# Patient Record
Sex: MALE | Race: WHITE | HISPANIC OR LATINO | Employment: FULL TIME | URBAN - METROPOLITAN AREA
[De-identification: names, ages, dates, MRNs, and addresses within clinical notes are randomized per-mention and may not be internally consistent; named-entity substitution may affect disease eponyms.]

---

## 2024-04-22 ENCOUNTER — OFFICE VISIT (OUTPATIENT)
Dept: URGENT CARE | Facility: CLINIC | Age: 39
End: 2024-04-22
Payer: COMMERCIAL

## 2024-04-22 VITALS
OXYGEN SATURATION: 95 % | SYSTOLIC BLOOD PRESSURE: 205 MMHG | DIASTOLIC BLOOD PRESSURE: 105 MMHG | HEART RATE: 82 BPM | RESPIRATION RATE: 20 BRPM | WEIGHT: 315 LBS | TEMPERATURE: 96.5 F

## 2024-04-22 DIAGNOSIS — R06.2 WHEEZING: ICD-10-CM

## 2024-04-22 DIAGNOSIS — I10 UNCONTROLLED HYPERTENSION: ICD-10-CM

## 2024-04-22 DIAGNOSIS — J02.9 SORE THROAT: Primary | ICD-10-CM

## 2024-04-22 DIAGNOSIS — R05.8 PRODUCTIVE COUGH: ICD-10-CM

## 2024-04-22 LAB — POC RAPID STREP: NEGATIVE

## 2024-04-22 PROCEDURE — 3080F DIAST BP >= 90 MM HG: CPT | Performed by: PHYSICIAN ASSISTANT

## 2024-04-22 PROCEDURE — 3077F SYST BP >= 140 MM HG: CPT | Performed by: PHYSICIAN ASSISTANT

## 2024-04-22 PROCEDURE — 1036F TOBACCO NON-USER: CPT | Performed by: PHYSICIAN ASSISTANT

## 2024-04-22 PROCEDURE — 87880 STREP A ASSAY W/OPTIC: CPT | Performed by: PHYSICIAN ASSISTANT

## 2024-04-22 PROCEDURE — 99204 OFFICE O/P NEW MOD 45 MIN: CPT | Performed by: PHYSICIAN ASSISTANT

## 2024-04-22 RX ORDER — ALBUTEROL SULFATE 90 UG/1
2 AEROSOL, METERED RESPIRATORY (INHALATION) EVERY 6 HOURS PRN
Qty: 18 G | Refills: 0 | Status: SHIPPED | OUTPATIENT
Start: 2024-04-22 | End: 2025-04-22

## 2024-04-22 RX ORDER — PREDNISONE 10 MG/1
50 TABLET ORAL DAILY
Qty: 25 TABLET | Refills: 0 | Status: SHIPPED | OUTPATIENT
Start: 2024-04-22 | End: 2024-04-27

## 2024-04-22 RX ORDER — AZITHROMYCIN 250 MG/1
TABLET, FILM COATED ORAL
Qty: 6 TABLET | Refills: 0 | Status: SHIPPED | OUTPATIENT
Start: 2024-04-22 | End: 2024-04-27

## 2024-04-22 RX ORDER — AMLODIPINE BESYLATE 5 MG/1
5 TABLET ORAL DAILY
Qty: 30 TABLET | Refills: 2 | Status: SHIPPED | OUTPATIENT
Start: 2024-04-22 | End: 2024-07-21

## 2024-04-22 ASSESSMENT — PAIN SCALES - GENERAL: PAINLEVEL: 4

## 2024-04-22 NOTE — PROGRESS NOTES
Subjective   Patient ID: Nicholas Barber Jr. is a 38 y.o. male who presents for Sore Throat (Wheeze, chest congestion) and Cough.  Patient notes that symptoms have been ongoing for a week now and not seeming to improve with over-the-counter medications.  Denies any chest pain outside of the cough denies any nausea vomiting diarrhea or abdominal pain.  He does endorse some shortness of breath but is satting well on room air without any tachycardia or tachypnea.  Patient notes that he did have negative home COVID testing yesterday and declines further testing today.     Of note patient is excessively hypertensive.  Notes that he has been treated in the past for hypertension but is unsure of the medication that he was treated with.  Notes that he lost medical coverage a few years ago and never followed up because the cost of the medications and cost of the visit were insurmountable for him.  He denies any headache blurry vision double vision.  He does note that he has chest pain with the cough but denies any chest pain outside of the cough.  Patient also notes that he has been using over-the-counter decongestants to help with his respiratory symptoms which have been helping his respiratory symptoms but given the situation likely contributing to his hypertension        The remainder of the systems were reviewed and are negative unless noted above      Objective   BP (!) 205/105   Pulse 82   Temp 35.8 °C (96.5 °F)   Resp 20   Wt (!) 182 kg (401 lb)   SpO2 95%   Physical Exam  Vitals reviewed.   Constitutional:       General: He is not in acute distress.     Appearance: Normal appearance. He is obese. He is not toxic-appearing.   HENT:      Head: Normocephalic.      Right Ear: Tympanic membrane and ear canal normal. No tenderness. No mastoid tenderness.      Left Ear: Tympanic membrane and ear canal normal. No tenderness. No mastoid tenderness.      Nose: Congestion and rhinorrhea present.      Mouth/Throat:       Mouth: Mucous membranes are moist.      Pharynx: Oropharynx is clear. Posterior oropharyngeal erythema present.   Eyes:      Conjunctiva/sclera: Conjunctivae normal.      Pupils: Pupils are equal, round, and reactive to light.   Cardiovascular:      Rate and Rhythm: Normal rate and regular rhythm.      Heart sounds: No murmur heard.  Pulmonary:      Effort: No respiratory distress.      Breath sounds: No stridor. Wheezing present. No rhonchi or rales.   Chest:      Chest wall: No tenderness.   Abdominal:      Tenderness: There is no abdominal tenderness. There is no guarding.   Musculoskeletal:         General: Normal range of motion.   Skin:     General: Skin is warm and dry.      Capillary Refill: Capillary refill takes less than 2 seconds.      Findings: No erythema.   Neurological:      General: No focal deficit present.      Mental Status: He is alert.         Assessment/Plan   Problem List Items Addressed This Visit       Sore throat - Primary    Relevant Orders    POCT rapid strep A manually resulted (Completed)    Productive cough    Relevant Medications    azithromycin (Zithromax Z-Anuel) 250 mg tablet    Wheezing    Relevant Medications    predniSONE (Deltasone) 10 mg tablet    albuterol 90 mcg/actuation inhaler    Uncontrolled hypertension    Relevant Medications    amLODIPine (Norvasc) 5 mg tablet   Patient with persistent bronchiectatic cough over the course the last 7 days continues to have intermittent fevers.  Wheezing noted on exam.  Patient does note a history of smoking but quit 2 years ago.  I am treating with prednisone and albuterol for the wheezing sending coverage with azithromycin.    Otherwise the patient is excessively hypertensive today at time of exam.  No chest pain outside of his cough no headache.  Patient has been treated with antihypertensive medication in the past but is unsure what this was.  Has not been seen by primary care for some time due to lapse in medical coverage and the  cost of his medications became insurmountable form.  I am sending 5 mg dose of amlodipine to the patient's pharmacy and recommending prompt follow-up with primary care for recheck of the blood pressure.  If there is any onset of chest pain outside of the cough any onset of headaches blurry vision double vision weakness or shortness of breath recommending patient proceed immediately to the emergency room

## 2024-04-22 NOTE — PATIENT INSTRUCTIONS
Assessment/Plan   Problem List Items Addressed This Visit       Sore throat - Primary    Relevant Orders    POCT rapid strep A manually resulted (Completed)    Productive cough    Relevant Medications    azithromycin (Zithromax Z-Anuel) 250 mg tablet    Wheezing    Relevant Medications    predniSONE (Deltasone) 10 mg tablet    albuterol 90 mcg/actuation inhaler    Uncontrolled hypertension    Relevant Medications    amLODIPine (Norvasc) 5 mg tablet   Patient with persistent bronchiectatic cough over the course the last 7 days continues to have intermittent fevers.  Wheezing noted on exam.  Patient does note a history of smoking but quit 2 years ago.  I am treating with prednisone and albuterol for the wheezing sending coverage with azithromycin.    Otherwise the patient is excessively hypertensive today at time of exam.  No chest pain outside of his cough no headache.  Patient has been treated with antihypertensive medication in the past but is unsure what this was.  Has not been seen by primary care for some time due to lapse in medical coverage and the cost of his medications became insurmountable form.  I am sending 5 mg dose of amlodipine to the patient's pharmacy and recommending prompt follow-up with primary care for recheck of the blood pressure.  If there is any onset of chest pain outside of the cough any onset of headaches blurry vision double vision weakness or shortness of breath recommending patient proceed immediately to the emergency room

## 2024-07-22 ENCOUNTER — APPOINTMENT (OUTPATIENT)
Dept: CARDIOLOGY | Facility: CLINIC | Age: 39
End: 2024-07-22
Payer: COMMERCIAL

## 2024-07-22 VITALS
DIASTOLIC BLOOD PRESSURE: 105 MMHG | HEART RATE: 70 BPM | OXYGEN SATURATION: 96 % | WEIGHT: 315 LBS | SYSTOLIC BLOOD PRESSURE: 188 MMHG

## 2024-07-22 DIAGNOSIS — G47.33 OBSTRUCTIVE SLEEP APNEA: ICD-10-CM

## 2024-07-22 DIAGNOSIS — G47.33 OBSTRUCTIVE SLEEP APNEA: Primary | ICD-10-CM

## 2024-07-22 DIAGNOSIS — E78.00 PURE HYPERCHOLESTEROLEMIA: ICD-10-CM

## 2024-07-22 DIAGNOSIS — I10 UNCONTROLLED HYPERTENSION: Primary | ICD-10-CM

## 2024-07-22 DIAGNOSIS — R94.31 ABNORMAL EKG: ICD-10-CM

## 2024-07-22 PROBLEM — E66.813 CLASS 3 SEVERE OBESITY DUE TO EXCESS CALORIES WITH BODY MASS INDEX (BMI) OF 40.0 TO 44.9 IN ADULT: Status: ACTIVE | Noted: 2024-07-22

## 2024-07-22 PROBLEM — E66.01 CLASS 3 SEVERE OBESITY DUE TO EXCESS CALORIES WITH BODY MASS INDEX (BMI) OF 40.0 TO 44.9 IN ADULT (MULTI): Status: ACTIVE | Noted: 2024-07-22

## 2024-07-22 PROBLEM — E78.5 HYPERLIPIDEMIA: Status: ACTIVE | Noted: 2024-07-22

## 2024-07-22 PROCEDURE — 1036F TOBACCO NON-USER: CPT | Performed by: INTERNAL MEDICINE

## 2024-07-22 PROCEDURE — 3080F DIAST BP >= 90 MM HG: CPT | Performed by: INTERNAL MEDICINE

## 2024-07-22 PROCEDURE — 3077F SYST BP >= 140 MM HG: CPT | Performed by: INTERNAL MEDICINE

## 2024-07-22 PROCEDURE — 99205 OFFICE O/P NEW HI 60 MIN: CPT | Performed by: INTERNAL MEDICINE

## 2024-07-22 PROCEDURE — 93000 ELECTROCARDIOGRAM COMPLETE: CPT | Performed by: INTERNAL MEDICINE

## 2024-07-22 RX ORDER — AMLODIPINE BESYLATE 5 MG/1
5 TABLET ORAL DAILY
Qty: 30 TABLET | Refills: 3 | Status: SHIPPED | OUTPATIENT
Start: 2024-07-22 | End: 2024-07-25

## 2024-07-22 RX ORDER — HYDROCHLOROTHIAZIDE 25 MG/1
25 TABLET ORAL DAILY
Qty: 90 TABLET | Refills: 3 | Status: SHIPPED | OUTPATIENT
Start: 2024-07-22 | End: 2025-07-22

## 2024-07-22 NOTE — PROGRESS NOTES
Referred by Dr. Durham for New Patient Visit (Patient is here as a new patient)     History Of Present Illness:    Nicholas Barber Jr. is a 39 y.o. male with history of hyperlipidemia, hypertension, morbid obesity, obstructive sleep apnea presenting with hypertension.  To ED several months ago with bronchitis-BP was high(over 200 mmhg) and started on amlodipine.  Note -BP had not been checked in several years  Had SOB with bronchitis-resolved  Occasional R ankle swelling  Patient denies chest pain/palpitations/dizziness/lightheadedness/claudication    Active-lift weight/pool exercise            Past Medical History:  HTN  HLD  Obesity    Past Surgical History:  None      Social History:  He reports that he has quit smoking. His smoking use included cigarettes. He has quit using smokeless tobacco. He reports that he does not currently use drugs after having used the following drugs: Marijuana. No history on file for alcohol use.    Family History:  Father had CABG at 38      Allergies:  Penicillins    Outpatient Medications:  Current Outpatient Medications   Medication Instructions    albuterol 90 mcg/actuation inhaler 2 puffs, inhalation, Every 6 hours PRN    amLODIPine (NORVASC) 5 mg, oral, Daily        Last Recorded Vitals:  Vitals:    07/22/24 0817 07/22/24 0905   BP:  (!) 188/105   BP Location: Left arm    Patient Position: Sitting    BP Cuff Size: Adult    Pulse: 70    SpO2: 96%    Weight: (!) 190 kg (418 lb)        Physical Exam:  Constitutional:       Appearance: Healthy appearance. Not in distress. Morbidly obese.   Neck:      Vascular: No JVR. JVD normal.   Pulmonary:      Effort: Pulmonary effort is normal.      Breath sounds: Normal breath sounds. No wheezing. No rhonchi. No rales.   Chest:      Chest wall: Not tender to palpatation.   Cardiovascular:      PMI at left midclavicular line. Normal rate. Regular rhythm. Normal S1. Normal S2.       Murmurs: There is no murmur.      No gallop.  No click.  "No rub.   Pulses:     Intact distal pulses.   Edema:     Peripheral edema absent.   Abdominal:      General: Abdomen is protuberant. Bowel sounds are normal.      Palpations: Abdomen is soft.      Tenderness: There is no abdominal tenderness.   Musculoskeletal: Normal range of motion.         General: No tenderness. Skin:     General: Skin is warm and dry.   Neurological:      General: No focal deficit present.      Mental Status: Alert and oriented to person, place and time.            Last Labs:  CBC -  No results found for: \"WBC\", \"HGB\", \"HCT\", \"MCV\", \"PLT\"    CMP -  No results found for: \"CALCIUM\", \"PHOS\", \"PROT\", \"ALBUMIN\", \"AST\", \"ALT\", \"ALKPHOS\", \"BILITOT\"    LIPID PANEL -   No results found for: \"CHOL\", \"TRIG\", \"HDL\", \"CHHDL\", \"LDLF\", \"VLDL\", \"LDLDIRECT\"    RENAL FUNCTION PANEL -   No results found for: \"GLUCOSE\", \"NA\", \"K\", \"CL\", \"CO2\", \"ANIONGAP\", \"BUN\", \"CREATININE\", \"GFRMALE\", \"CALCIUM\", \"PHOS\", \"ALBUMIN\"     No results found for: \"BNP\", \"HGBA1C\"    Last Cardiology Tests:  ECG:  Normal sinus rhythm  T wave abnormality consider inferolateral ischemia    Echo:  No results found for this or any previous visit from the past 1095 days.      Ejection Fractions:  No results found for: \"EF\"    Cath:  No results found for this or any previous visit from the past 1095 days.      Stress Test:  No results found for this or any previous visit from the past 1095 days.      Cardiac Imaging:  No results found for this or any previous visit from the past 1095 days.        Lab review: I have personally reviewed the laboratory result(s)     Assessment/Plan   Problem List Items Addressed This Visit       Uncontrolled hypertension - Primary    Overview     BP elevated in office  Only on low dose amlodipine-increase amlodipine and add hydrochlorothiazide  Check BMP 1 week         Relevant Orders    ECG 12 Lead (Completed)    Hyperlipidemia    Overview     Check lipids         Abnormal EKG    Overview     INFLAT T wave changes " -may be repolarization  changes related to long standing uncontrolled HTN  No angina  Control/BP/check echo         Obstructive sleep apnea    Overview     No longer wearing CPAP  Still snores-no recent evaluation.  Check sleep study         Weight loss  Watch salt intake  Increase amlodipine to 5 mg daily  Start hydrochlorothiazide once daily for BP  BMP 1 week  Lipids  Echo=HTN/abn EKG  Sleep study    Todd Durham, DO

## 2024-07-22 NOTE — PATIENT INSTRUCTIONS
Weight loss  Watch salt intake  Increase amlodipine to 5 mg daily  Start hydrochlorothiazide once daily for BP  BMP 1 week  Lipids  Echo=HTN/abn EKG  Sleep study

## 2024-07-25 DIAGNOSIS — I10 UNCONTROLLED HYPERTENSION: ICD-10-CM

## 2024-07-25 RX ORDER — AMLODIPINE BESYLATE 5 MG/1
5 TABLET ORAL DAILY
Qty: 90 TABLET | Refills: 3 | Status: SHIPPED | OUTPATIENT
Start: 2024-07-25

## 2024-07-29 ENCOUNTER — LAB (OUTPATIENT)
Dept: LAB | Facility: LAB | Age: 39
End: 2024-07-29
Payer: COMMERCIAL

## 2024-07-29 DIAGNOSIS — I10 UNCONTROLLED HYPERTENSION: ICD-10-CM

## 2024-07-29 DIAGNOSIS — E78.00 PURE HYPERCHOLESTEROLEMIA: ICD-10-CM

## 2024-07-29 LAB
ANION GAP SERPL CALC-SCNC: 12 MMOL/L (ref 10–20)
BUN SERPL-MCNC: 15 MG/DL (ref 6–23)
CALCIUM SERPL-MCNC: 9.8 MG/DL (ref 8.6–10.3)
CHLORIDE SERPL-SCNC: 100 MMOL/L (ref 98–107)
CHOLEST SERPL-MCNC: 241 MG/DL (ref 0–199)
CHOLESTEROL/HDL RATIO: 5.9
CO2 SERPL-SCNC: 29 MMOL/L (ref 21–32)
CREAT SERPL-MCNC: 1.03 MG/DL (ref 0.5–1.3)
EGFRCR SERPLBLD CKD-EPI 2021: >90 ML/MIN/1.73M*2
GLUCOSE SERPL-MCNC: 105 MG/DL (ref 74–99)
HDLC SERPL-MCNC: 40.6 MG/DL
LDLC SERPL CALC-MCNC: 163 MG/DL
NON HDL CHOLESTEROL: 200 MG/DL (ref 0–149)
POTASSIUM SERPL-SCNC: 3.7 MMOL/L (ref 3.5–5.3)
SODIUM SERPL-SCNC: 137 MMOL/L (ref 136–145)
TRIGL SERPL-MCNC: 186 MG/DL (ref 0–149)
VLDL: 37 MG/DL (ref 0–40)

## 2024-07-29 PROCEDURE — 36415 COLL VENOUS BLD VENIPUNCTURE: CPT

## 2024-07-29 PROCEDURE — 80048 BASIC METABOLIC PNL TOTAL CA: CPT

## 2024-07-29 PROCEDURE — 80061 LIPID PANEL: CPT

## 2024-08-02 ENCOUNTER — TELEPHONE (OUTPATIENT)
Dept: CARDIOLOGY | Facility: CLINIC | Age: 39
End: 2024-08-02
Payer: COMMERCIAL

## 2024-08-02 DIAGNOSIS — E78.00 PURE HYPERCHOLESTEROLEMIA: ICD-10-CM

## 2024-08-02 RX ORDER — ROSUVASTATIN CALCIUM 20 MG/1
20 TABLET, COATED ORAL DAILY
Qty: 30 TABLET | Refills: 11 | Status: SHIPPED | OUTPATIENT
Start: 2024-08-02 | End: 2025-08-02

## 2024-08-02 NOTE — TELEPHONE ENCOUNTER
----- Message from Todd Durham sent at 8/1/2024 10:29 PM EDT -----  Regarding: FW:  Start rosuvastatin 20 mg daily  ----- Message -----  From: Lab, Background User  Sent: 7/29/2024   2:24 PM EDT  To: Todd Durham, DO

## 2024-08-07 ENCOUNTER — APPOINTMENT (OUTPATIENT)
Dept: CARDIOLOGY | Facility: CLINIC | Age: 39
End: 2024-08-07
Payer: COMMERCIAL

## 2024-08-27 ENCOUNTER — APPOINTMENT (OUTPATIENT)
Dept: CARDIOLOGY | Facility: CLINIC | Age: 39
End: 2024-08-27
Payer: COMMERCIAL

## 2024-09-04 ENCOUNTER — HOSPITAL ENCOUNTER (OUTPATIENT)
Dept: CARDIOLOGY | Facility: CLINIC | Age: 39
Discharge: HOME | End: 2024-09-04
Payer: COMMERCIAL

## 2024-09-04 VITALS
DIASTOLIC BLOOD PRESSURE: 105 MMHG | WEIGHT: 315 LBS | BODY MASS INDEX: 50.62 KG/M2 | HEIGHT: 66 IN | SYSTOLIC BLOOD PRESSURE: 188 MMHG

## 2024-09-04 DIAGNOSIS — R06.00 DYSPNEA, UNSPECIFIED: ICD-10-CM

## 2024-09-04 DIAGNOSIS — I10 UNCONTROLLED HYPERTENSION: ICD-10-CM

## 2024-09-04 DIAGNOSIS — R94.31 ABNORMAL EKG: ICD-10-CM

## 2024-09-04 LAB
AORTIC VALVE MEAN GRADIENT: 3.6 MMHG
AORTIC VALVE PEAK VELOCITY: 1.4 M/S
AV PEAK GRADIENT: 7.9 MMHG
AVA (PEAK VEL): 3.99 CM2
AVA (VTI): 4.43 CM2
EJECTION FRACTION APICAL 4 CHAMBER: 54.8
EJECTION FRACTION: 48 %
LEFT ATRIUM VOLUME AREA LENGTH INDEX BSA: 28 ML/M2
LEFT VENTRICLE INTERNAL DIMENSION DIASTOLE: 5.38 CM (ref 3.5–6)
LEFT VENTRICULAR OUTFLOW TRACT DIAMETER: 2.42 CM
MITRAL VALVE E/A RATIO: 1.24
RIGHT VENTRICLE FREE WALL PEAK S': 1.2 CM/S
RIGHT VENTRICLE PEAK SYSTOLIC PRESSURE: 30.3 MMHG
TRICUSPID ANNULAR PLANE SYSTOLIC EXCURSION: 2.6 CM

## 2024-09-04 PROCEDURE — 2500000004 HC RX 250 GENERAL PHARMACY W/ HCPCS (ALT 636 FOR OP/ED): Performed by: INTERNAL MEDICINE

## 2024-09-04 PROCEDURE — 93306 TTE W/DOPPLER COMPLETE: CPT | Performed by: INTERNAL MEDICINE

## 2024-09-04 PROCEDURE — 93306 TTE W/DOPPLER COMPLETE: CPT

## 2024-09-10 ENCOUNTER — APPOINTMENT (OUTPATIENT)
Dept: CARDIOLOGY | Facility: CLINIC | Age: 39
End: 2024-09-10
Payer: COMMERCIAL

## 2024-09-10 VITALS
SYSTOLIC BLOOD PRESSURE: 172 MMHG | WEIGHT: 315 LBS | BODY MASS INDEX: 69.73 KG/M2 | OXYGEN SATURATION: 96 % | DIASTOLIC BLOOD PRESSURE: 90 MMHG | HEART RATE: 82 BPM

## 2024-09-10 DIAGNOSIS — E78.00 PURE HYPERCHOLESTEROLEMIA: ICD-10-CM

## 2024-09-10 DIAGNOSIS — R94.31 ABNORMAL EKG: Primary | ICD-10-CM

## 2024-09-10 DIAGNOSIS — R93.1 ABNORMAL ECHOCARDIOGRAM: ICD-10-CM

## 2024-09-10 DIAGNOSIS — I10 UNCONTROLLED HYPERTENSION: ICD-10-CM

## 2024-09-10 DIAGNOSIS — G47.33 OBSTRUCTIVE SLEEP APNEA: ICD-10-CM

## 2024-09-10 DIAGNOSIS — E66.01 CLASS 3 SEVERE OBESITY DUE TO EXCESS CALORIES WITHOUT SERIOUS COMORBIDITY WITH BODY MASS INDEX (BMI) OF 40.0 TO 44.9 IN ADULT (MULTI): ICD-10-CM

## 2024-09-10 PROBLEM — M79.89 SWELLING OF LOWER EXTREMITY: Status: ACTIVE | Noted: 2024-09-10

## 2024-09-10 PROBLEM — I42.0 DILATED CARDIOMYOPATHY (MULTI): Status: ACTIVE | Noted: 2024-09-10

## 2024-09-10 PROCEDURE — 1036F TOBACCO NON-USER: CPT | Performed by: INTERNAL MEDICINE

## 2024-09-10 PROCEDURE — 99215 OFFICE O/P EST HI 40 MIN: CPT | Performed by: INTERNAL MEDICINE

## 2024-09-10 PROCEDURE — 3077F SYST BP >= 140 MM HG: CPT | Performed by: INTERNAL MEDICINE

## 2024-09-10 PROCEDURE — 3079F DIAST BP 80-89 MM HG: CPT | Performed by: INTERNAL MEDICINE

## 2024-09-10 RX ORDER — NAPROXEN SODIUM 220 MG/1
81 TABLET, FILM COATED ORAL DAILY
Qty: 30 TABLET | Refills: 11 | COMMUNITY
Start: 2024-09-10 | End: 2025-09-10

## 2024-09-10 RX ORDER — METOPROLOL SUCCINATE 50 MG/1
50 TABLET, EXTENDED RELEASE ORAL DAILY
Qty: 90 TABLET | Refills: 3 | Status: SHIPPED | OUTPATIENT
Start: 2024-09-10 | End: 2025-09-10

## 2024-09-10 RX ORDER — LOSARTAN POTASSIUM 50 MG/1
50 TABLET ORAL DAILY
Qty: 90 TABLET | Refills: 3 | Status: SHIPPED | OUTPATIENT
Start: 2024-09-10 | End: 2025-09-10

## 2024-09-10 NOTE — PROGRESS NOTES
Chief Complaint:   Follow-up (Patient is here for a follow up/)     History Of Present Illness:    Nicholas Barber Jr. is a 39 y.o. male presenting after testing and med changes follow-up.  Has some swelling in the feet  Patient denies chest pain/SOB/palpitations/dizziness/lightheadedness/claudication  Active=walks/kettlebells       Last Recorded Vitals:  Vitals:    09/10/24 1029 09/10/24 1052   BP: 165/88 172/90   BP Location: Left arm    Patient Position: Sitting    BP Cuff Size: Large adult    Pulse: 82    SpO2: 96%    Weight: (!) 196 kg (432 lb)             Allergies:  Penicillins    Outpatient Medications:  Current Outpatient Medications   Medication Instructions    albuterol 90 mcg/actuation inhaler 2 puffs, inhalation, Every 6 hours PRN    amLODIPine (NORVASC) 5 mg, oral, Daily    hydroCHLOROthiazide (HYDRODIURIL) 25 mg, oral, Daily    rosuvastatin (CRESTOR) 20 mg, oral, Daily       Physical Exam:  Constitutional:       Appearance: Healthy appearance. Not in distress. Morbidly obese.   Neck:      Vascular: No JVR. JVD normal.   Pulmonary:      Effort: Pulmonary effort is normal.      Breath sounds: Normal breath sounds. No wheezing. No rhonchi. No rales.   Chest:      Chest wall: Not tender to palpatation.   Cardiovascular:      PMI at left midclavicular line. Normal rate. Regular rhythm. Normal S1. Normal S2.       Murmurs: There is no murmur.      No gallop.  No click. No rub.   Pulses:     Intact distal pulses.   Edema:     Pretibial: bilateral trace pitting edema of the pretibial area.     Ankle: bilateral trace pitting edema of the ankle.     Feet: bilateral trace pitting edema of the feet.  Abdominal:      General: Abdomen is protuberant. Bowel sounds are normal.      Palpations: Abdomen is soft.      Tenderness: There is no abdominal tenderness.   Musculoskeletal: Normal range of motion.         General: No tenderness. Skin:     General: Skin is warm and dry.   Neurological:      General: No focal  "deficit present.      Mental Status: Alert and oriented to person, place and time.          Last Labs:  CBC -  No results found for: \"WBC\", \"HGB\", \"HCT\", \"MCV\", \"PLT\"    CMP -  Lab Results   Component Value Date    CALCIUM 9.8 07/29/2024       LIPID PANEL -   Lab Results   Component Value Date    CHOL 241 (H) 07/29/2024    TRIG 186 (H) 07/29/2024    HDL 40.6 07/29/2024    CHHDL 5.9 07/29/2024    VLDL 37 07/29/2024              RENAL FUNCTION PANEL -   Lab Results   Component Value Date    GLUCOSE 105 (H) 07/29/2024     07/29/2024    K 3.7 07/29/2024     07/29/2024    CO2 29 07/29/2024    ANIONGAP 12 07/29/2024    BUN 15 07/29/2024    CREATININE 1.03 07/29/2024    CALCIUM 9.8 07/29/2024        No results found for: \"BNP\", \"HGBA1C\"              Lab review: I have personally reviewed the laboratory result(s)       Problem List Items Addressed This Visit       Uncontrolled hypertension    Overview     BP elevated in office  Stopping amlodipine with edema  Start metoprolol succinate/losartan as EF low  Continue hydrochlorothiazide           Hyperlipidemia    Overview     7/2024 lipids elevated  Started HI statin         Abnormal EKG - Primary    Overview     INFLAT T wave changes -may be repolarization  changes related to long standing uncontrolled HTN  Did have LVH on echo but also with INF WMA  No angina         Obstructive sleep apnea    Overview     No longer wearing CPAP  Still snores-no recent evaluation.  Check sleep study         Class 3 severe obesity due to excess calories with body mass index (BMI) of 40.0 to 44.9 in adult (Multi)    Overview     Needs weight loss         Abnormal echocardiogram    Overview     INF WMA-although difficult images  No angina  Add ASA          Watch salt intake  Weight loss  Stop amlodipine=may be causing swelling  Start losartan 50 mg daily  Start metoprolol succinate 50 mg daily  Start aspirin 81 mg daily  Reschedule sleep study  Todd Durham, DO  "

## 2024-09-10 NOTE — PATIENT INSTRUCTIONS
Watch salt intake  Weight loss  Stop amlodipine=may be causing swelling  Start losartan 50 mg daily  Start metoprolol succinate 50 mg daily  Start aspirin 81 mg daily  Reschedule sleep study

## 2024-10-21 ENCOUNTER — CLINICAL SUPPORT (OUTPATIENT)
Dept: SLEEP MEDICINE | Facility: CLINIC | Age: 39
End: 2024-10-21
Payer: COMMERCIAL

## 2024-10-21 DIAGNOSIS — G47.33 OBSTRUCTIVE SLEEP APNEA: ICD-10-CM

## 2024-10-21 NOTE — PROGRESS NOTES
Type of Study: HOME SLEEP STUDY - NOMAD     The patient received equipment and instructions for use of the [facility] Nomad HSAT device. The patient was instructed how to apply the effort belts, cannula, thermistor. It was also explained how the Nomad and oximeter components work.  The patient was asked to record their sleep for an 8-hour period.     The patient was informed of their responsibility for the device and acknowledged this by signing the HSAT device contract. The patient was asked to return the device on 10/22/2024 between the hours of 06:30 am - 06:30 pm to the [facility].     The patient was instructed to call 911 as usual for any medical- emergencies while at home.  The patient was also given a phone number for troubleshooting when using the device in case there were additional questions.

## 2024-10-30 ENCOUNTER — OFFICE VISIT (OUTPATIENT)
Dept: CARDIOLOGY | Facility: CLINIC | Age: 39
End: 2024-10-30
Payer: COMMERCIAL

## 2024-10-30 VITALS
DIASTOLIC BLOOD PRESSURE: 90 MMHG | BODY MASS INDEX: 50.62 KG/M2 | HEIGHT: 66 IN | WEIGHT: 315 LBS | OXYGEN SATURATION: 98 % | HEART RATE: 82 BPM | SYSTOLIC BLOOD PRESSURE: 155 MMHG

## 2024-10-30 DIAGNOSIS — E66.813 CLASS 3 SEVERE OBESITY DUE TO EXCESS CALORIES WITHOUT SERIOUS COMORBIDITY WITH BODY MASS INDEX (BMI) OF 40.0 TO 44.9 IN ADULT: ICD-10-CM

## 2024-10-30 DIAGNOSIS — I10 UNCONTROLLED HYPERTENSION: ICD-10-CM

## 2024-10-30 DIAGNOSIS — R94.31 ABNORMAL EKG: ICD-10-CM

## 2024-10-30 DIAGNOSIS — E66.01 CLASS 3 SEVERE OBESITY DUE TO EXCESS CALORIES WITHOUT SERIOUS COMORBIDITY WITH BODY MASS INDEX (BMI) OF 40.0 TO 44.9 IN ADULT: ICD-10-CM

## 2024-10-30 DIAGNOSIS — R93.1 ABNORMAL ECHOCARDIOGRAM: ICD-10-CM

## 2024-10-30 DIAGNOSIS — I42.0 DILATED CARDIOMYOPATHY (MULTI): Primary | ICD-10-CM

## 2024-10-30 DIAGNOSIS — G47.33 OBSTRUCTIVE SLEEP APNEA: ICD-10-CM

## 2024-10-30 DIAGNOSIS — E78.00 PURE HYPERCHOLESTEROLEMIA: ICD-10-CM

## 2024-10-30 PROCEDURE — 99215 OFFICE O/P EST HI 40 MIN: CPT | Performed by: INTERNAL MEDICINE

## 2024-10-30 PROCEDURE — 3008F BODY MASS INDEX DOCD: CPT | Performed by: INTERNAL MEDICINE

## 2024-10-30 PROCEDURE — 3077F SYST BP >= 140 MM HG: CPT | Performed by: INTERNAL MEDICINE

## 2024-10-30 PROCEDURE — 3078F DIAST BP <80 MM HG: CPT | Performed by: INTERNAL MEDICINE

## 2024-10-30 RX ORDER — METOPROLOL SUCCINATE 50 MG/1
75 TABLET, EXTENDED RELEASE ORAL DAILY
Qty: 135 TABLET | Refills: 3 | Status: SHIPPED | OUTPATIENT
Start: 2024-10-30 | End: 2025-10-30

## 2024-10-30 RX ORDER — LOSARTAN POTASSIUM 100 MG/1
100 TABLET ORAL DAILY
Qty: 90 TABLET | Refills: 3 | Status: SHIPPED | OUTPATIENT
Start: 2024-10-30 | End: 2025-10-30

## 2024-12-09 DIAGNOSIS — I10 UNCONTROLLED HYPERTENSION: ICD-10-CM

## 2024-12-09 RX ORDER — METOPROLOL SUCCINATE 50 MG/1
75 TABLET, EXTENDED RELEASE ORAL DAILY
Qty: 135 TABLET | Refills: 3 | Status: SHIPPED | OUTPATIENT
Start: 2024-12-09 | End: 2025-12-09

## 2024-12-09 RX ORDER — LOSARTAN POTASSIUM 100 MG/1
100 TABLET ORAL DAILY
Qty: 90 TABLET | Refills: 3 | Status: SHIPPED | OUTPATIENT
Start: 2024-12-09 | End: 2025-12-09

## 2025-01-02 ENCOUNTER — APPOINTMENT (OUTPATIENT)
Dept: CARDIOLOGY | Facility: CLINIC | Age: 40
End: 2025-01-02
Payer: COMMERCIAL

## 2025-01-03 ENCOUNTER — APPOINTMENT (OUTPATIENT)
Dept: CARDIOLOGY | Facility: CLINIC | Age: 40
End: 2025-01-03
Payer: COMMERCIAL

## 2025-05-29 ENCOUNTER — OFFICE VISIT (OUTPATIENT)
Dept: CARDIOLOGY | Facility: CLINIC | Age: 40
End: 2025-05-29
Payer: COMMERCIAL

## 2025-05-29 VITALS
DIASTOLIC BLOOD PRESSURE: 88 MMHG | SYSTOLIC BLOOD PRESSURE: 162 MMHG | OXYGEN SATURATION: 96 % | BODY MASS INDEX: 69.4 KG/M2 | WEIGHT: 315 LBS | HEART RATE: 79 BPM

## 2025-05-29 DIAGNOSIS — G47.33 OBSTRUCTIVE SLEEP APNEA (ADULT) (PEDIATRIC): Primary | ICD-10-CM

## 2025-05-29 DIAGNOSIS — I42.0 DILATED CARDIOMYOPATHY (MULTI): ICD-10-CM

## 2025-05-29 DIAGNOSIS — E78.00 PURE HYPERCHOLESTEROLEMIA: ICD-10-CM

## 2025-05-29 DIAGNOSIS — I10 UNCONTROLLED HYPERTENSION: ICD-10-CM

## 2025-05-29 DIAGNOSIS — E66.01 MORBID OBESITY (MULTI): ICD-10-CM

## 2025-05-29 PROCEDURE — 3079F DIAST BP 80-89 MM HG: CPT | Performed by: INTERNAL MEDICINE

## 2025-05-29 PROCEDURE — 99214 OFFICE O/P EST MOD 30 MIN: CPT | Performed by: INTERNAL MEDICINE

## 2025-05-29 PROCEDURE — 99212 OFFICE O/P EST SF 10 MIN: CPT | Performed by: INTERNAL MEDICINE

## 2025-05-29 PROCEDURE — 1036F TOBACCO NON-USER: CPT | Performed by: INTERNAL MEDICINE

## 2025-05-29 PROCEDURE — 3077F SYST BP >= 140 MM HG: CPT | Performed by: INTERNAL MEDICINE

## 2025-05-29 RX ORDER — METOPROLOL SUCCINATE 100 MG/1
100 TABLET, EXTENDED RELEASE ORAL DAILY
Qty: 90 TABLET | Refills: 3 | Status: SHIPPED | OUTPATIENT
Start: 2025-05-29 | End: 2026-05-29

## 2025-05-29 RX ORDER — HYDROCHLOROTHIAZIDE 50 MG/1
50 TABLET ORAL DAILY
Qty: 90 TABLET | Refills: 3 | Status: SHIPPED | OUTPATIENT
Start: 2025-05-29 | End: 2026-05-29

## 2025-05-29 NOTE — PATIENT INSTRUCTIONS
BMP 1 week  Lipids  TSH  Refer to sleep medicine-untreated sleep apnea  Weight loss  Increase metoprolol to 100 mg daily for bp  Increase hydrochlorothiazide to 50 mg daily for bp  Stay active-weight loss

## 2025-05-29 NOTE — PROGRESS NOTES
Chief Complaint:   6 month follow up , Hypertension, and Cardiomyopathy     History Of Present Illness:    Nicholas Barber Jr. is a 39 y.o. male presenting after testing and med changes follow-up.  Actually feeling better-more active/breathing easier    Patient denies chest pain/palpitations/dizziness/lightheadedness/claudication/edema  Active=walks       Last Recorded Vitals:  Vitals:    05/29/25 1133   BP: 162/88   BP Location: Left arm   Patient Position: Sitting   BP Cuff Size: Large adult   Pulse: 79   SpO2: 96%   Weight: (!) 195 kg (430 lb)            Allergies:  Penicillins    Outpatient Medications:  Current Outpatient Medications   Medication Instructions    albuterol 90 mcg/actuation inhaler 2 puffs, inhalation, Every 6 hours PRN    aspirin 81 mg, oral, Daily    hydroCHLOROthiazide (HYDRODIURIL) 25 mg, oral, Daily    losartan (COZAAR) 100 mg, oral, Daily    metoprolol succinate XL (TOPROL-XL) 75 mg, oral, Daily, Do not crush or chew.    rosuvastatin (CRESTOR) 20 mg, oral, Daily       Physical Exam:  Constitutional:       Appearance: Healthy appearance. Not in distress. Morbidly obese.   Neck:      Vascular: No JVR. JVD normal.   Pulmonary:      Effort: Pulmonary effort is normal.      Breath sounds: Normal breath sounds. No wheezing. No rhonchi. No rales.   Chest:      Chest wall: Not tender to palpatation.   Cardiovascular:      PMI at left midclavicular line. Normal rate. Regular rhythm. Normal S1. Normal S2.       Murmurs: There is no murmur.      No gallop.  No click. No rub.   Pulses:     Intact distal pulses.   Edema:     Pretibial: bilateral trace pitting edema of the pretibial area.     Ankle: bilateral trace pitting edema of the ankle.     Feet: bilateral trace pitting edema of the feet.  Abdominal:      General: Abdomen is protuberant. Bowel sounds are normal.      Palpations: Abdomen is soft.      Tenderness: There is no abdominal tenderness.   Musculoskeletal: Normal range of motion.          "General: No tenderness. Skin:     General: Skin is warm and dry.   Neurological:      General: No focal deficit present.      Mental Status: Alert and oriented to person, place and time.          Last Labs:  CBC -  No results found for: \"WBC\", \"HGB\", \"HCT\", \"MCV\", \"PLT\"    CMP -  Lab Results   Component Value Date    CALCIUM 9.8 07/29/2024       LIPID PANEL -   Lab Results   Component Value Date    CHOL 241 (H) 07/29/2024    TRIG 186 (H) 07/29/2024    HDL 40.6 07/29/2024    CHHDL 5.9 07/29/2024    VLDL 37 07/29/2024              RENAL FUNCTION PANEL -   Lab Results   Component Value Date    GLUCOSE 105 (H) 07/29/2024     07/29/2024    K 3.7 07/29/2024     07/29/2024    CO2 29 07/29/2024    ANIONGAP 12 07/29/2024    BUN 15 07/29/2024    CREATININE 1.03 07/29/2024    CALCIUM 9.8 07/29/2024        No results found for: \"BNP\", \"HGBA1C\"              Lab review: I have personally reviewed the laboratory result(s)       Problem List Items Addressed This Visit       Uncontrolled hypertension    Overview   BP elevated in office  Increase metoprolol  Increase hydrochlorothiazide  Did not tolerate amlodipine-edema  Check BMP 1week           Hyperlipidemia    Overview   7/2024 lipids elevated  Started HI statin  Recheck         Obstructive sleep apnea (adult) (pediatric) - Primary    Overview   Severe per 10/2024 sleep study  Refer to sleep medicine         Morbid obesity (Multi)    Overview   Needs weight loss         Dilated cardiomyopathy (Multi)    Overview   EF 45-50% per 9/2024 echo  No overt signs CHF  Optimize BP  Increase metoprolol  Increase hydrochlorothiazide  Continue losartan  Treat sleep apnea  Recheck BMP        BMP 1 week  Lipids  TSH  Refer to sleep medicine-untreated sleep apnea  Weight loss  Increase metoprolol to 100 mg daily for bp  Increase hydrochlorothiazide to 50 mg daily for bp  Todd Durham, DO  "

## 2025-06-07 LAB
ANION GAP SERPL CALCULATED.4IONS-SCNC: 12 MMOL/L (CALC) (ref 7–17)
BUN SERPL-MCNC: 15 MG/DL (ref 7–25)
BUN/CREAT SERPL: ABNORMAL (CALC) (ref 6–22)
CALCIUM SERPL-MCNC: 9.6 MG/DL (ref 8.6–10.3)
CHLORIDE SERPL-SCNC: 101 MMOL/L (ref 98–110)
CHOLEST SERPL-MCNC: 120 MG/DL
CHOLEST/HDLC SERPL: 3.4 (CALC)
CO2 SERPL-SCNC: 27 MMOL/L (ref 20–32)
CREAT SERPL-MCNC: 1.29 MG/DL (ref 0.6–1.29)
EGFRCR SERPLBLD CKD-EPI 2021: 71 ML/MIN/1.73M2
GLUCOSE SERPL-MCNC: 100 MG/DL (ref 65–99)
HDLC SERPL-MCNC: 35 MG/DL
LDLC SERPL CALC-MCNC: 62 MG/DL (CALC)
NONHDLC SERPL-MCNC: 85 MG/DL (CALC)
POTASSIUM SERPL-SCNC: 4.1 MMOL/L (ref 3.5–5.3)
SODIUM SERPL-SCNC: 140 MMOL/L (ref 135–146)
TRIGL SERPL-MCNC: 152 MG/DL
TSH SERPL-ACNC: 2.44 MIU/L (ref 0.4–4.5)

## 2025-06-20 DIAGNOSIS — E78.00 PURE HYPERCHOLESTEROLEMIA: ICD-10-CM

## 2025-06-20 RX ORDER — ROSUVASTATIN CALCIUM 20 MG/1
20 TABLET, COATED ORAL DAILY
Qty: 90 TABLET | Refills: 3 | Status: SHIPPED | OUTPATIENT
Start: 2025-06-20

## 2025-06-24 ENCOUNTER — APPOINTMENT (OUTPATIENT)
Dept: CARDIOLOGY | Facility: CLINIC | Age: 40
End: 2025-06-24
Payer: COMMERCIAL

## 2025-08-06 PROBLEM — J02.9 SORE THROAT: Status: RESOLVED | Noted: 2024-04-22 | Resolved: 2025-08-06

## 2025-08-29 ENCOUNTER — APPOINTMENT (OUTPATIENT)
Dept: CARDIOLOGY | Facility: CLINIC | Age: 40
End: 2025-08-29
Payer: COMMERCIAL

## 2025-09-02 ENCOUNTER — OFFICE VISIT (OUTPATIENT)
Dept: CARDIOLOGY | Facility: CLINIC | Age: 40
End: 2025-09-02
Payer: COMMERCIAL

## 2025-09-02 VITALS
DIASTOLIC BLOOD PRESSURE: 78 MMHG | OXYGEN SATURATION: 98 % | HEART RATE: 68 BPM | SYSTOLIC BLOOD PRESSURE: 142 MMHG | BODY MASS INDEX: 68.44 KG/M2 | WEIGHT: 315 LBS

## 2025-09-02 DIAGNOSIS — R93.1 ABNORMAL ECHOCARDIOGRAM: ICD-10-CM

## 2025-09-02 DIAGNOSIS — I10 UNCONTROLLED HYPERTENSION: Primary | ICD-10-CM

## 2025-09-02 DIAGNOSIS — G47.33 OBSTRUCTIVE SLEEP APNEA (ADULT) (PEDIATRIC): ICD-10-CM

## 2025-09-02 DIAGNOSIS — I42.0 DILATED CARDIOMYOPATHY (MULTI): ICD-10-CM

## 2025-09-02 DIAGNOSIS — E78.00 PURE HYPERCHOLESTEROLEMIA: ICD-10-CM

## 2025-09-02 PROCEDURE — 99214 OFFICE O/P EST MOD 30 MIN: CPT | Performed by: INTERNAL MEDICINE

## 2025-09-02 PROCEDURE — 3078F DIAST BP <80 MM HG: CPT | Performed by: INTERNAL MEDICINE

## 2025-09-02 PROCEDURE — 1036F TOBACCO NON-USER: CPT | Performed by: INTERNAL MEDICINE

## 2025-09-02 PROCEDURE — 99212 OFFICE O/P EST SF 10 MIN: CPT

## 2025-09-02 PROCEDURE — 3077F SYST BP >= 140 MM HG: CPT | Performed by: INTERNAL MEDICINE

## 2025-09-02 RX ORDER — DOXAZOSIN 2 MG/1
2 TABLET ORAL NIGHTLY
Qty: 90 TABLET | Refills: 3 | Status: SHIPPED | OUTPATIENT
Start: 2025-09-02 | End: 2026-09-02